# Patient Record
Sex: MALE | Race: WHITE | NOT HISPANIC OR LATINO | Employment: FULL TIME | URBAN - METROPOLITAN AREA
[De-identification: names, ages, dates, MRNs, and addresses within clinical notes are randomized per-mention and may not be internally consistent; named-entity substitution may affect disease eponyms.]

---

## 2018-07-02 ENCOUNTER — OFFICE VISIT (OUTPATIENT)
Dept: URGENT CARE | Facility: PHYSICIAN GROUP | Age: 48
End: 2018-07-02
Payer: COMMERCIAL

## 2018-07-02 ENCOUNTER — APPOINTMENT (OUTPATIENT)
Dept: RADIOLOGY | Facility: IMAGING CENTER | Age: 48
End: 2018-07-02
Attending: PHYSICIAN ASSISTANT
Payer: COMMERCIAL

## 2018-07-02 VITALS
HEIGHT: 72 IN | TEMPERATURE: 99.8 F | WEIGHT: 133 LBS | SYSTOLIC BLOOD PRESSURE: 106 MMHG | HEART RATE: 108 BPM | DIASTOLIC BLOOD PRESSURE: 72 MMHG | OXYGEN SATURATION: 98 % | BODY MASS INDEX: 18.01 KG/M2

## 2018-07-02 DIAGNOSIS — M79.641 RIGHT HAND PAIN: ICD-10-CM

## 2018-07-02 DIAGNOSIS — S69.91XA INJURY OF RIGHT HAND, INITIAL ENCOUNTER: ICD-10-CM

## 2018-07-02 DIAGNOSIS — S62.356A CLOSED NONDISPLACED FRACTURE OF SHAFT OF FIFTH METACARPAL BONE OF RIGHT HAND, INITIAL ENCOUNTER: ICD-10-CM

## 2018-07-02 PROCEDURE — 99204 OFFICE O/P NEW MOD 45 MIN: CPT | Performed by: PHYSICIAN ASSISTANT

## 2018-07-02 PROCEDURE — 73130 X-RAY EXAM OF HAND: CPT | Mod: TC,RT | Performed by: PHYSICIAN ASSISTANT

## 2018-07-02 RX ORDER — HYDROCODONE BITARTRATE AND ACETAMINOPHEN 5; 325 MG/1; MG/1
1-2 TABLET ORAL EVERY 4 HOURS PRN
Qty: 20 TAB | Refills: 0 | Status: SHIPPED | OUTPATIENT
Start: 2018-07-02 | End: 2018-07-07

## 2018-07-02 ASSESSMENT — ENCOUNTER SYMPTOMS
SHORTNESS OF BREATH: 0
FEVER: 0
SENSORY CHANGE: 0
HEADACHES: 0
SEIZURES: 0
BLURRED VISION: 0
TREMORS: 0
FOCAL WEAKNESS: 0
COUGH: 0
DOUBLE VISION: 0
PALPITATIONS: 0
LOSS OF CONSCIOUSNESS: 0
DIZZINESS: 0
SPEECH CHANGE: 0
CHILLS: 0
TINGLING: 0

## 2018-07-02 ASSESSMENT — PAIN SCALES - GENERAL: PAINLEVEL: 8=MODERATE-SEVERE PAIN
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
## 2018-07-02 NOTE — PATIENT INSTRUCTIONS
Metacarpal Fracture  A metacarpal fracture is a break (fracture) of a bone in the hand. Metacarpals are the bones that extend from your knuckles to your wrist. In each hand, you have five metacarpal bones that connect your fingers and your thumb to your wrist.  Some hand fractures have bone pieces that are close together and stable (simple). These fractures may be treated with only a splint or cast. Hand fractures that have many pieces of broken bone (comminuted), unstable bone pieces (displaced), or a bone that breaks through the skin (compound) usually require surgery.  What are the causes?  This injury may be caused by:  · A fall.  · A hard, direct hit to your hand.  · An injury that squeezes your knuckle, stretches your finger out of place, or crushes your hand.  What increases the risk?  This injury is more likely to occur if:  · You play contact sports.  · You have certain bone diseases.  What are the signs or symptoms?  Symptoms of this type of fracture develop soon after the injury. Symptoms may include:  · Swelling.  · Pain.  · Stiffness.  · Increased pain with movement.  · Bruising.  · Inability to move a finger.  · A shortened finger.  · A finger knuckle that looks sunken in.  · Unusual appearance of the hand or finger (deformity).  How is this diagnosed?  This injury may be diagnosed based on your signs and symptoms, especially if you had a recent hand injury. Your health care provider will perform a physical exam. He or she may also order X-rays to confirm the diagnosis.  How is this treated?  Treatment for this injury depends on the type of fracture you have and how severe it is. Possible treatments include:  · Non-reduction. This can be done if the bone does not need to be moved back into place. The fracture can be casted or splinted as it is.  · Closed reduction. If your bone is stable and can be moved back into place, you may only need to wear a cast or splint or have ramon taping.  · Closed  reduction with internal fixation (CRIF). This is the most common treatment. You may have this procedure if your bone can be moved back into place but needs more support. Wires, pins, or screws may be inserted through your skin to stabilize the fracture.  · Open reduction with internal fixation (ORIF). This may be needed if your fracture is severe and unstable. It involves surgery to move your bone back into the right position. Screws, wires, or plates are used to stabilize the fracture.  After all procedures, you may need to wear a cast or a splint for several weeks. You will also need to have follow-up X-rays to make sure that the bone is healing well and staying in position. After you no longer need your cast or splint, you may need physical therapy. This will help you to regain full movement and strength in your hand.  Follow these instructions at home:  If you have a cast:  · Do not stick anything inside the cast to scratch your skin. Doing that increases your risk of infection.  · Check the skin around the cast every day. Report any concerns to your health care provider. You may put lotion on dry skin around the edges of the cast. Do not apply lotion to the skin underneath the cast.  If you have a splint:  · Wear it as directed by your health care provider. Remove it only as directed by your health care provider.  · Loosen the splint if your fingers become numb and tingle, or if they turn cold and blue.  Bathing  · Cover the cast or splint with a watertight plastic bag to protect it from water while you take a bath or a shower. Do not let the cast or splint get wet.  Managing pain, stiffness, and swelling  · If directed, apply ice to the injured area (if you have a splint, not a cast):  ¨ Put ice in a plastic bag.  ¨ Place a towel between your skin and the bag.  ¨ Leave the ice on for 20 minutes, 2-3 times a day.  · Move your fingers often to avoid stiffness and to lessen swelling.  · Raise the injured area  above the level of your heart while you are sitting or lying down.  Driving  · Do not drive or operate heavy machinery while taking pain medicine.  · Do not drive while wearing a cast or splint on a hand that you use for driving.  Activity  · Return to your normal activities as directed by your health care provider. Ask your health care provider what activities are safe for you.  General instructions  · Do not put pressure on any part of the cast or splint until it is fully hardened. This may take several hours.  · Keep the cast or splint clean and dry.  · Do not use any tobacco products, including cigarettes, chewing tobacco, or electronic cigarettes. Tobacco can delay bone healing. If you need help quitting, ask your health care provider.  · Take medicines only as directed by your health care provider.  · Keep all follow-up visits as directed by your health care provider. This is important.  Contact a health care provider if:  · Your pain is getting worse.  · You have redness, swelling, or pain in the injured area.  · You have fluid, blood, or pus coming from under your cast or splint.  · You notice a bad smell coming from under your cast or splint.  · You have a fever.  Get help right away if:  · You develop a rash.  · You have trouble breathing.  · Your skin or nails on your injured hand turn blue or gray even after you loosen your splint.  · Your injured hand feels cold or becomes numb even after you loosen your splint.  · You develop severe pain under the cast or in your hand.  This information is not intended to replace advice given to you by your health care provider. Make sure you discuss any questions you have with your health care provider.  Document Released: 12/18/2006 Document Revised: 05/25/2017 Document Reviewed: 10/07/2015  [employer] Interactive Patient Education © 2017 [employer]

## 2018-07-03 ENCOUNTER — OFFICE VISIT (OUTPATIENT)
Dept: MEDICAL GROUP | Facility: CLINIC | Age: 48
End: 2018-07-03
Payer: COMMERCIAL

## 2018-07-03 VITALS
SYSTOLIC BLOOD PRESSURE: 110 MMHG | DIASTOLIC BLOOD PRESSURE: 72 MMHG | OXYGEN SATURATION: 98 % | TEMPERATURE: 98.2 F | HEIGHT: 72 IN | WEIGHT: 133 LBS | HEART RATE: 100 BPM | BODY MASS INDEX: 18.01 KG/M2 | RESPIRATION RATE: 18 BRPM

## 2018-07-03 DIAGNOSIS — S62.336A CLOSED DISPLACED FRACTURE OF NECK OF FIFTH METACARPAL BONE OF RIGHT HAND, INITIAL ENCOUNTER: ICD-10-CM

## 2018-07-03 DIAGNOSIS — Z72.0 CHEWS TOBACCO: ICD-10-CM

## 2018-07-03 PROCEDURE — 99203 OFFICE O/P NEW LOW 30 MIN: CPT | Performed by: FAMILY MEDICINE

## 2018-07-03 ASSESSMENT — ENCOUNTER SYMPTOMS
SHORTNESS OF BREATH: 0
CHILLS: 0
FEVER: 0
NAUSEA: 0
VOMITING: 0
DIZZINESS: 0
HEADACHES: 0
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
## 2018-07-03 NOTE — PATIENT INSTRUCTIONS
[facility]  [place] Main Office  [address] [address] [phone]    APPOINTMENT TODAY at 1:30 PM with Dr. Trenton Francis  7/3/18  ARRIVE 15 to 30 minutes BEFORE your appointment

## 2018-11-07 ENCOUNTER — TELEPHONE (OUTPATIENT)
Dept: MEDICAL GROUP | Facility: PHYSICIAN GROUP | Age: 48
End: 2018-11-07

## 2018-11-07 NOTE — TELEPHONE ENCOUNTER
Future Appointments       Provider Department Center    11/8/2018 9:55 AM Nury Aguilar M.D. [facility]        NEW PATIENT VISIT PRE-VISIT PLANNING    1.  [employer] Patient is checked in Patient Demographics? YES    2.  Immunizations were updated in [facility] using WebIZ?: Yes       •  Web Iz Recommendations: FLU, MMR , TD and VARICELLA (Chicken Pox)     3.  Is this appointment scheduled as a Hospital Follow-Up? No    4.  Patient is due for the following Health Maintenance Topics:   Health Maintenance Due   Topic Date Due   • IMM INFLUENZA (1) 09/01/2018       5.  Reviewed/Updated the following with patient:   •   Preferred Pharmacy? NO       •   Preferred Lab? NO       •   Preferred Communication? NO       •   Allergies? NO       •   Medications? NO       •   Social History? NO       •   Family History (document living status of immediate family members and if + hx of cancer, diabetes, hypertension, hyperlipidemia, heart attack, stroke) NO    6.  Updated Care Team?       •   DME Company (gait device, O2, CPAP, etc.) NO       •   Other Specialists (eye doctor, derm, GYN, cardiology, endo, etc): NO    7.  MDX printed for Provider? NO    8.  Patient was informed to arrive 15 min prior to their   scheduled appointment and bring in their medication bottles. LVM was unable to get in contact with Pt. prior to visit to complete PVP.

## 2018-11-08 ENCOUNTER — HOSPITAL ENCOUNTER (OUTPATIENT)
Dept: LAB | Facility: MEDICAL CENTER | Age: 48
End: 2018-11-08
Attending: FAMILY MEDICINE
Payer: COMMERCIAL

## 2018-11-08 ENCOUNTER — OFFICE VISIT (OUTPATIENT)
Dept: MEDICAL GROUP | Facility: PHYSICIAN GROUP | Age: 48
End: 2018-11-08
Payer: COMMERCIAL

## 2018-11-08 VITALS
RESPIRATION RATE: 18 BRPM | TEMPERATURE: 97.2 F | HEART RATE: 88 BPM | WEIGHT: 124 LBS | OXYGEN SATURATION: 97 % | HEIGHT: 72 IN | BODY MASS INDEX: 16.8 KG/M2 | DIASTOLIC BLOOD PRESSURE: 78 MMHG | SYSTOLIC BLOOD PRESSURE: 110 MMHG

## 2018-11-08 DIAGNOSIS — Z13.1 SCREENING FOR DIABETES MELLITUS: ICD-10-CM

## 2018-11-08 DIAGNOSIS — Z11.3 SCREEN FOR STD (SEXUALLY TRANSMITTED DISEASE): ICD-10-CM

## 2018-11-08 DIAGNOSIS — Z23 NEED FOR VACCINATION: ICD-10-CM

## 2018-11-08 DIAGNOSIS — F10.10 ALCOHOL ABUSE: ICD-10-CM

## 2018-11-08 DIAGNOSIS — R43.2 TASTE PERVERSION: ICD-10-CM

## 2018-11-08 DIAGNOSIS — Z13.6 SCREENING FOR CARDIOVASCULAR CONDITION: ICD-10-CM

## 2018-11-08 LAB
ALBUMIN SERPL BCP-MCNC: 4.9 G/DL (ref 3.2–4.9)
ALBUMIN/GLOB SERPL: 1.3 G/DL
ALP SERPL-CCNC: 105 U/L (ref 30–99)
ALT SERPL-CCNC: 73 U/L (ref 2–50)
ANION GAP SERPL CALC-SCNC: 16 MMOL/L (ref 0–11.9)
AST SERPL-CCNC: 168 U/L (ref 12–45)
BASOPHILS # BLD AUTO: 1.2 % (ref 0–1.8)
BASOPHILS # BLD: 0.04 K/UL (ref 0–0.12)
BILIRUB SERPL-MCNC: 1.8 MG/DL (ref 0.1–1.5)
BUN SERPL-MCNC: 6 MG/DL (ref 8–22)
CALCIUM SERPL-MCNC: 10 MG/DL (ref 8.5–10.5)
CHLORIDE SERPL-SCNC: 94 MMOL/L (ref 96–112)
CHOLEST SERPL-MCNC: 273 MG/DL (ref 100–199)
CO2 SERPL-SCNC: 25 MMOL/L (ref 20–33)
CREAT SERPL-MCNC: 0.75 MG/DL (ref 0.5–1.4)
EOSINOPHIL # BLD AUTO: 0.05 K/UL (ref 0–0.51)
EOSINOPHIL NFR BLD: 1.5 % (ref 0–6.9)
ERYTHROCYTE [DISTWIDTH] IN BLOOD BY AUTOMATED COUNT: 46.3 FL (ref 35.9–50)
EST. AVERAGE GLUCOSE BLD GHB EST-MCNC: 105 MG/DL
GLOBULIN SER CALC-MCNC: 3.7 G/DL (ref 1.9–3.5)
GLUCOSE SERPL-MCNC: 92 MG/DL (ref 65–99)
HBA1C MFR BLD: 5.3 % (ref 0–5.6)
HCT VFR BLD AUTO: 45.4 % (ref 42–52)
HDLC SERPL-MCNC: 136 MG/DL
HGB BLD-MCNC: 15.7 G/DL (ref 14–18)
IMM GRANULOCYTES # BLD AUTO: 0.01 K/UL (ref 0–0.11)
IMM GRANULOCYTES NFR BLD AUTO: 0.3 % (ref 0–0.9)
LDLC SERPL CALC-MCNC: 119 MG/DL
LYMPHOCYTES # BLD AUTO: 1.06 K/UL (ref 1–4.8)
LYMPHOCYTES NFR BLD: 30.9 % (ref 22–41)
MCH RBC QN AUTO: 34.6 PG (ref 27–33)
MCHC RBC AUTO-ENTMCNC: 34.6 G/DL (ref 33.7–35.3)
MCV RBC AUTO: 100 FL (ref 81.4–97.8)
MONOCYTES # BLD AUTO: 0.46 K/UL (ref 0–0.85)
MONOCYTES NFR BLD AUTO: 13.4 % (ref 0–13.4)
NEUTROPHILS # BLD AUTO: 1.81 K/UL (ref 1.82–7.42)
NEUTROPHILS NFR BLD: 52.7 % (ref 44–72)
NRBC # BLD AUTO: 0 K/UL
NRBC BLD-RTO: 0 /100 WBC
PLATELET # BLD AUTO: 103 K/UL (ref 164–446)
PMV BLD AUTO: 11.1 FL (ref 9–12.9)
POTASSIUM SERPL-SCNC: 4.8 MMOL/L (ref 3.6–5.5)
PROT SERPL-MCNC: 8.6 G/DL (ref 6–8.2)
RBC # BLD AUTO: 4.54 M/UL (ref 4.7–6.1)
SODIUM SERPL-SCNC: 135 MMOL/L (ref 135–145)
TRIGL SERPL-MCNC: 92 MG/DL (ref 0–149)
WBC # BLD AUTO: 3.4 K/UL (ref 4.8–10.8)

## 2018-11-08 PROCEDURE — 36415 COLL VENOUS BLD VENIPUNCTURE: CPT

## 2018-11-08 PROCEDURE — 82607 VITAMIN B-12: CPT

## 2018-11-08 PROCEDURE — 80061 LIPID PANEL: CPT

## 2018-11-08 PROCEDURE — 82746 ASSAY OF FOLIC ACID SERUM: CPT

## 2018-11-08 PROCEDURE — 86780 TREPONEMA PALLIDUM: CPT

## 2018-11-08 PROCEDURE — 99214 OFFICE O/P EST MOD 30 MIN: CPT | Mod: 25 | Performed by: FAMILY MEDICINE

## 2018-11-08 PROCEDURE — 87389 HIV-1 AG W/HIV-1&-2 AB AG IA: CPT

## 2018-11-08 PROCEDURE — 86704 HEP B CORE ANTIBODY TOTAL: CPT

## 2018-11-08 PROCEDURE — 83036 HEMOGLOBIN GLYCOSYLATED A1C: CPT

## 2018-11-08 PROCEDURE — 86706 HEP B SURFACE ANTIBODY: CPT

## 2018-11-08 PROCEDURE — 85025 COMPLETE CBC W/AUTO DIFF WBC: CPT

## 2018-11-08 PROCEDURE — 84443 ASSAY THYROID STIM HORMONE: CPT

## 2018-11-08 PROCEDURE — 90471 IMMUNIZATION ADMIN: CPT | Performed by: FAMILY MEDICINE

## 2018-11-08 PROCEDURE — 87340 HEPATITIS B SURFACE AG IA: CPT

## 2018-11-08 PROCEDURE — 86803 HEPATITIS C AB TEST: CPT

## 2018-11-08 PROCEDURE — 80053 COMPREHEN METABOLIC PANEL: CPT

## 2018-11-08 PROCEDURE — 90686 IIV4 VACC NO PRSV 0.5 ML IM: CPT | Performed by: FAMILY MEDICINE

## 2018-11-08 PROCEDURE — 82306 VITAMIN D 25 HYDROXY: CPT

## 2018-11-08 PROCEDURE — 84425 ASSAY OF VITAMIN B-1: CPT

## 2018-11-08 RX ORDER — CETIRIZINE HYDROCHLORIDE 10 MG/1
10 TABLET ORAL DAILY
COMMUNITY
End: 2020-06-02

## 2018-11-08 ASSESSMENT — PATIENT HEALTH QUESTIONNAIRE - PHQ9
CLINICAL INTERPRETATION OF PHQ2 SCORE: 2
5. POOR APPETITE OR OVEREATING: 1 - SEVERAL DAYS
SUM OF ALL RESPONSES TO PHQ QUESTIONS 1-9: 8

## 2018-11-08 NOTE — PROGRESS NOTES
cc: alcohol dependence      Subjective:     Enzo Quintero is a 47 y.o. male presenting for the following:     Alcohol dependence: patient has had a long history of alcohol abuse. He was admitted in 2011 for alcohol withdrawal. He currently drinks about 8 beers a day and he will get nauseated and tremors if he tries to not drink even for one day.   Both his parents and his brother struggled with alcohol.     His longest time of sobriety was one year. He was in [facility] for the acute withdrawal and then went to  regularly but eventually fell of the wagon. He has also tried outpatient therapy in the past and this was not very helpful.     He does not have any GERD symptoms, abd pain, vomiting, jaundice, diarrhea. He has never used IV drugs.     Taste perversion- he has noticed that for the last year or so he will be eating of drinking something and it will taste very salty to him when it should be sweet. He does not have this problem when he is not eating or drinking. He does not smell things that are not there. No changes in sensation or weakness.     Review of systems:  All others reviewed and are negative.       Current Outpatient Prescriptions:   •  cetirizine (ZYRTEC) 10 MG Tab, Take 10 mg by mouth every day., Disp: , Rfl:   •  folic acid (FOLVITE) 1 MG TABS, Take 1 mg by mouth every day., Disp: , Rfl:   •  Multiple Vitamin (THERAGRAN PO), Take 1 Tab by mouth every day., Disp: , Rfl:   •  omeprazole (PRILOSEC) 20 MG CPDR, Take 20 mg by mouth every day., Disp: , Rfl:   •  thiamine 100 MG tablet, Take 100 mg by mouth every day., Disp: , Rfl:   •  lidocaine viscous 2% (XYLOCAINE) 2 % SOLN, Take 15 mL by mouth every 3 hours as needed., Disp: , Rfl:     Allergies, past medical history, past surgical history, family history, social history reviewed and updated    Objective:     Vitals: /78 (BP Location: Left arm, Patient Position: Sitting, BP Cuff Size: Adult)   Pulse 88   Temp 36.2 °C (97.2 °F) (Temporal)    Resp 18   Ht 1.829 m (6')   Wt 56.2 kg (124 lb)   SpO2 97%   BMI 16.82 kg/m²   General: Alert, pleasant, NAD  HEENT: Normocephalic.   EOMI, no icterus or pallor.  Conjunctivae and lids normal. External ears normal. Oropharynx non-erythematous, mucous membranes moist.    Neck supple.  No thyromegaly or masses palpated. No cervical or supraclavicular lymphadenopathy.  Heart: Regular rate and rhythm.  S1 and S2 normal.  No murmurs appreciated.  Respiratory: Normal respiratory effort.  Clear to auscultation bilaterally.  Abdomen: Non-distended, soft  Skin: Warm, dry, no rashes.  Musculoskeletal: Gait is normal.  Moves all extremities well.  Extremities: No leg edema.    Neurological: No tremors, sensation grossly intact,  tone/strength normal, gait is normal, CN2-12 grossly intact  Psych:  Affect is normal, judgement is good, memory is intact, grooming is appropriate.    Assessment/Plan:     Enzo was seen today for annual exam.    Diagnoses and all orders for this visit:    Alcohol abuse:  Patient with daily alcohol intake and is quick to withdrawal if he stops drinking. He has failed outpatient therapy. He is motivated to get treatment as he is aware that this can lead to early death in many ways.   -Patient given phone number to Dotty Gr with behavioral health for placement in treatment.   -     TSH WITH REFLEX TO FT4; Future  -     VITAMIN B12; Future  -     VITAMIN D,25 HYDROXY; Future  -     CBC WITH DIFFERENTIAL; Future  -     COMP METABOLIC PANEL; Future  -     HIV AG/AB COMBO ASSAY SCREENING; Future  -     T.PALLIDUM AB EIA; Future  -     HEP C VIRUS ANTIBODY; Future  -     HEP B CORE AB TOTAL; Future  -     HEP B SURFACE ANTIGEN; Future  -     HEP B SURFACE AB; Future  -     VITAMIN B1; Future  -     FOLATE; Future    Taste perversion: patient tasting salt when eating sweet foods and drink. Will check for vitamin deficiency but if no obvious abnormality on bloods found, will refer to Neurology.   -      VITAMIN B12; Future  -     VITAMIN B1; Future  -     FOLATE; Future    Screening for cardiovascular condition  -     Lipid Profile; Future    Screening for diabetes mellitus  -     HEMOGLOBIN A1C; Future    Screen for STD (sexually transmitted disease): never IV drug use.   -     HIV AG/AB COMBO ASSAY SCREENING; Future  -     T.PALLIDUM AB EIA; Future  -     HEP C VIRUS ANTIBODY; Future  -     HEP B CORE AB TOTAL; Future  -     HEP B SURFACE ANTIGEN; Future  -     HEP B SURFACE AB; Future    Need for vaccination Patient due for vaccination.  Patient warned of possible side effect including pain, reaction at injection site, fatigue, low-grade fever.  Also, patient warned of uncommon severe reactions including allergic reaction/anaphylaxis.     -     Influenza Vaccine Quad Injection >3Y (PF)      Return in about 3 months (around 2/8/2019).

## 2018-11-09 LAB
25(OH)D3 SERPL-MCNC: 4 NG/ML (ref 30–100)
FOLATE SERPL-MCNC: 10.8 NG/ML
HBV CORE AB SERPL QL IA: NEGATIVE
HBV SURFACE AB SERPL IA-ACNC: <3.1 MIU/ML (ref 0–10)
HBV SURFACE AG SER QL: NEGATIVE
HCV AB SER QL: NEGATIVE
HIV 1+2 AB+HIV1 P24 AG SERPL QL IA: NON REACTIVE
TREPONEMA PALLIDUM IGG+IGM AB [PRESENCE] IN SERUM OR PLASMA BY IMMUNOASSAY: NON REACTIVE
TSH SERPL DL<=0.005 MIU/L-ACNC: 2.22 UIU/ML (ref 0.38–5.33)
VIT B12 SERPL-MCNC: 979 PG/ML (ref 211–911)

## 2018-11-15 LAB — VIT B1 BLD-MCNC: 51 NMOL/L (ref 70–180)

## 2018-11-19 ENCOUNTER — TELEPHONE (OUTPATIENT)
Dept: MEDICAL GROUP | Facility: PHYSICIAN GROUP | Age: 48
End: 2018-11-19

## 2018-11-20 ENCOUNTER — OFFICE VISIT (OUTPATIENT)
Dept: MEDICAL GROUP | Facility: PHYSICIAN GROUP | Age: 48
End: 2018-11-20
Payer: COMMERCIAL

## 2018-11-20 VITALS
OXYGEN SATURATION: 95 % | SYSTOLIC BLOOD PRESSURE: 120 MMHG | TEMPERATURE: 97.9 F | HEART RATE: 100 BPM | DIASTOLIC BLOOD PRESSURE: 88 MMHG | WEIGHT: 127 LBS | BODY MASS INDEX: 17.2 KG/M2 | RESPIRATION RATE: 18 BRPM | HEIGHT: 72 IN

## 2018-11-20 DIAGNOSIS — R74.01 TRANSAMINITIS: ICD-10-CM

## 2018-11-20 DIAGNOSIS — F10.10 ALCOHOL ABUSE: ICD-10-CM

## 2018-11-20 DIAGNOSIS — E55.9 VITAMIN D DEFICIENCY: ICD-10-CM

## 2018-11-20 DIAGNOSIS — R43.2 TASTE PERVERSION: ICD-10-CM

## 2018-11-20 DIAGNOSIS — E51.9 THIAMINE DEFICIENCY: ICD-10-CM

## 2018-11-20 DIAGNOSIS — D69.6 THROMBOCYTOPENIA (HCC): ICD-10-CM

## 2018-11-20 PROCEDURE — 99214 OFFICE O/P EST MOD 30 MIN: CPT | Performed by: FAMILY MEDICINE

## 2018-11-20 RX ORDER — ERGOCALCIFEROL 1.25 MG/1
50000 CAPSULE ORAL
Qty: 12 CAP | Refills: 0 | Status: SHIPPED | OUTPATIENT
Start: 2018-11-20 | End: 2019-05-21

## 2018-11-20 RX ORDER — LANOLIN ALCOHOL/MO/W.PET/CERES
100 CREAM (GRAM) TOPICAL DAILY
Qty: 30 TAB | Refills: 5 | Status: SHIPPED | OUTPATIENT
Start: 2018-11-20 | End: 2018-11-21 | Stop reason: SDUPTHER

## 2018-11-20 RX ORDER — FOLIC ACID 1 MG/1
1 TABLET ORAL DAILY
Qty: 30 TAB | Refills: 5 | Status: SHIPPED | OUTPATIENT
Start: 2018-11-20 | End: 2019-05-21

## 2018-11-20 NOTE — LETTER
[facility]  Nury Aguilar M.D.  [address]  [place] [address]  Fax: [phone]   Authorization for Release/Disclosure of   Protected Health Information   Name: ENZO BONNER : 1970 SSN: [id number]   Address: [facility] [place] [address] Phone:    [phone] (home)    I authorize the entity listed below to release/disclose the PHI below to:   [facility]/Nury Aguilar M.D. and Nury Aguilar M.D.   Provider or Entity Name:     Address   City, State, Zip   Phone:      Fax:     Reason for request: continuity of care   Information to be released:    [  ] LAST COLONOSCOPY,  including any PATH REPORT and follow-up  [  ] LAST FIT/COLOGUARD RESULT [  ] LAST DEXA  [  ] LAST MAMMOGRAM  [  ] LAST PAP  [  ] LAST LABS [  ] RETINA EXAM REPORT  [  ] IMMUNIZATION RECORDS  [  ] Release all info      [  ] Check here and initial the line next to each item to release ALL health information INCLUDING  _____ Care and treatment for drug and / or alcohol abuse  _____ HIV testing, infection status, or AIDS  _____ Genetic Testing    DATES OF SERVICE OR TIME PERIOD TO BE DISCLOSED: _____________  I understand and acknowledge that:  * This Authorization may be revoked at any time by you in writing, except if your health information has already been used or disclosed.  * Your health information that will be used or disclosed as a result of you signing this authorization could be re-disclosed by the recipient. If this occurs, your re-disclosed health information may no longer be protected by State or Federal laws.  * You may refuse to sign this Authorization. Your refusal will not affect your ability to obtain treatment.  * This Authorization becomes effective upon signing and will  on (date) __________.      If no date is indicated, this Authorization will  one (1) year from the signature date.    Name: Enzo Bonner    Signature:   Date:     2018       PLEASE FAX REQUESTED  RECORDS BACK TO: [phone]

## 2018-11-20 NOTE — PROGRESS NOTES
cc: abnormal test results      Subjective:     Enzo Quintero is a 48 y.o. male presenting for the following:     On recent lab results, patient found to have thiamine deficiency, and vitamin D deficiency. He does have a normal diet and no food restrictions. He does alcohol abuse. No tingling or numbness to his hands/feet. No confusion or changes in speech or concentration. No CP, palpitations, swelling, SOB with exercise.     Also, he was found to have a transaminitis and elevated MCV, which are consistent with alcohol abuse. Also with low platelet count. He denies jaundice, abd pain, vomiting, easy bruising/bleeding.       Review of systems:  All others reviewed and are negative.       Current Outpatient Prescriptions:   •  thiamine (THIAMINE) 100 MG tablet, Take 1 Tab by mouth every day., Disp: 30 Tab, Rfl: 5  •  vitamin D, Ergocalciferol, (DRISDOL) 23936 units Cap capsule, Take 1 Cap by mouth every 7 days., Disp: 12 Cap, Rfl: 0  •  folic acid (FOLVITE) 1 MG Tab, Take 1 Tab by mouth every day., Disp: 30 Tab, Rfl: 5  •  cetirizine (ZYRTEC) 10 MG Tab, Take 10 mg by mouth every day., Disp: , Rfl:   •  Multiple Vitamin (THERAGRAN PO), Take 1 Tab by mouth every day., Disp: , Rfl:   •  omeprazole (PRILOSEC) 20 MG CPDR, Take 20 mg by mouth every day., Disp: , Rfl:   •  lidocaine viscous 2% (XYLOCAINE) 2 % SOLN, Take 15 mL by mouth every 3 hours as needed., Disp: , Rfl:     Allergies, past medical history, past surgical history, family history, social history reviewed and updated    Objective:     Vitals: /88 (BP Location: Left arm, Patient Position: Sitting, BP Cuff Size: Adult)   Pulse 100   Temp 36.6 °C (97.9 °F) (Temporal)   Resp 18   Ht 1.829 m (6')   Wt 57.6 kg (127 lb)   SpO2 95%   BMI 17.22 kg/m²   General: Alert, pleasant, NAD  HEENT: Normocephalic.  Oropharynx non-erythematous, mucous membranes moist.    Heart: Regular rate and rhythm.  S1 and S2 normal.  No murmurs appreciated.  Respiratory:  Normal respiratory effort.  Clear to auscultation bilaterally.  Abdomen: Non-distended, soft, NT  Skin: Warm, dry, no rashes.  Musculoskeletal: Gait is normal.  Moves all extremities well.  Extremities: No leg edema.    Neurological: No tremors, sensation grossly intact,  tone/strength normal, gait is normal, CN2-12 grossly intact  Psych:  Affect is normal, judgement is good, memory is intact, grooming is appropriate.    Assessment/Plan:     Enzo was seen today for results.    Diagnoses and all orders for this visit:    Taste perversion Patient has been tasting salty when it should be sweet. Patient declines MRI of head currently. He would like to treat his vitamin deficiencies and will then agree to imaging if not improved.  Possibly sinus infection but patient without history of allergic rhinitis or facial pressure.     Thrombocytopenia (HCC): concern for decreased liver function due to alcohol. will recheck in 2 weeks. No symptoms.   -     CBC WITHOUT DIFFERENTIAL; Future    Thiamine deficiency No symptoms of wet or dry beriberi or wernicke currently. Suggest daily high dose oral thiamine with folic acid supplementation and daily folic acid.   -     thiamine (THIAMINE) 100 MG tablet; Take 1 Tab by mouth every day.  -     folic acid (FOLVITE) 1 MG Tab; Take 1 Tab by mouth every day.    Vitamin D deficiency  -     vitamin D, Ergocalciferol, (DRISDOL) 38654 units Cap capsule; Take 1 Cap by mouth every 7 days.    Alcohol abuse/Transaminitis: prior imaging in 2011 with normal echogenicity of the liver. Will recheck. Neg for Hep B/C.    -     US-ABDOMEN COMPLETE SURVEY; Future      Return in about 2 months (around 1/20/2019).

## 2018-11-20 NOTE — TELEPHONE ENCOUNTER
Future Appointments       Provider Department Center    11/20/2018 11:35 AM Nury Aguilar M.D. [facility]        ESTABLISHED PATIENT PRE-VISIT PLANNING     Patient was NOT contacted to complete PVP.     Note: Patient will not be contacted if there is no indication to call.     1.  Reviewed notes from the last few office visits within the medical group: Yes    2.  If any orders were placed at last visit or intended to be done for this visit (i.e. 6 mos follow-up), do we have Results/Consult Notes?        •  Labs - Labs ordered, completed on 11/08/18 and results are in chart.       •  Imaging - Imaging was not ordered at last office visit.       •  Referrals - No referrals were ordered at last office visit.    3. Is this appointment scheduled as a Hospital Follow-Up? No    4.  Immunizations were updated in [facility] using WebIZ?: Yes       •  Web Iz Recommendations: MMR , TD and VARICELLA (Chicken Pox)     5.  Patient is due for the following Health Maintenance Topics:   Health Maintenance Due   Topic Date Due   • IMM HEP B VACCINE (1 of 3 - Risk 3-dose series) 11/14/1991       6.  MDX printed for Provider? NO    7.  Patient was NOT informed to arrive 15 min prior to their scheduled appointment and bring in their medication bottles. No VM was unable to get in contact with  Pt.

## 2018-11-21 DIAGNOSIS — E51.9 THIAMINE DEFICIENCY: ICD-10-CM

## 2018-11-21 RX ORDER — LANOLIN ALCOHOL/MO/W.PET/CERES
100 CREAM (GRAM) TOPICAL DAILY
Qty: 30 TAB | Refills: 5 | Status: SHIPPED
Start: 2018-11-21 | End: 2019-02-05

## 2018-12-31 ENCOUNTER — OFFICE VISIT (OUTPATIENT)
Dept: BEHAVIORAL HEALTH | Facility: CLINIC | Age: 48
End: 2018-12-31
Payer: COMMERCIAL

## 2018-12-31 DIAGNOSIS — F10.920 ALCOHOLIC INTOXICATION WITHOUT COMPLICATION (HCC): ICD-10-CM

## 2018-12-31 DIAGNOSIS — F10.20 ALCOHOL USE DISORDER, SEVERE, DEPENDENCE (HCC): ICD-10-CM

## 2018-12-31 PROBLEM — F10.929 ALCOHOL INTOXICATION (HCC): Status: ACTIVE | Noted: 2018-12-31

## 2018-12-31 PROCEDURE — 90791 PSYCH DIAGNOSTIC EVALUATION: CPT | Performed by: PSYCHOLOGIST

## 2018-12-31 NOTE — BH THERAPY
[facility]  INITIAL ASSESSMENT    Name: Enzo Quintero  MRN: [id number]  : 1970  Age: 48 y.o.  Date of assessment: 2018  PCP: Nury Aguilar M.D.  Persons in attendance: Patient  Total session time: 50 minutes      CHIEF COMPLAINT AND HISTORY OF PRESENTING PROBLEM:  (as stated by Patient):  Enzo Quintero is a 48 y.o., White male referred for assessment by No ref. provider found.  Primary presenting issue includes   Chief Complaint   Patient presents with   • Alcohol Problem   The patient ID/Chief Complaint:  The patient is a 48 year old male, , .  The patient self-referred and voluntarily presented for an individual intake to address chronic alcohol use disorder patient reports that he is currently drinking 10-16 drinks with his drink of choice being beer every day he also indicated that 30 minutes prior to the appointment he consumed 1 beer he was not operating a vehicle his wife drove the patient to the appointment.  During the session he obtained a blood alcohol level of 0.2. Reviewed limits of confidentiality and [facility] policies; patient expressed understanding and agreed to voluntarily proceed with evaluation and treatment.     Review of Symptoms:  Depression and other mood disorders   Increase in sleep No    Decrease in sleep No    Interest (anhedonia) No     Guilt feeling Yes   Worthless No   Hopelessness No    Regret Not Reported/Not Observed     Energy deficit No     Concentration deficit No      Appetite deficit No   Psychomotor   Retardation No     Agitation No      Suicidality No   Distractibility No    Indiscretions No    Grandiosity No    Flight of ideas No    Activity level Increase No   Sleep deficit (decreased need for 3 days) No   Talkativeness No    Anxiety Symptoms   Panic Attacks No     Last   Duration   Frequency   Night Time No   Breathing Difficulties No   Shallow Breathing No   Chest Pain No   Chest Pressure/ Chest  Tightness No   Heart Pounding/Heart Palpitations No   Hyperventilation No   Sweating No   Muscle Tension/ Sore Muscles No   Concentration Problems No   Depersonalization/ Derealization No   Difficulty Speaking No   Digestion Issues No   Dizziness No   Headaches No   Lightheadedness No   Fear No   Feeling Ill No   Worrying No   Nausea No   Feeling Overwhelmed No   Feeling Shaky No   Low Energy No   Shaking No     Restlessness No     Easily fatigued No       Social Anxiety No       PTSD No       Sleep Disturbance Denies    Difficulty falling asleep No    Difficulty staying asleep No    Restless No    Unsatisfying sleep No    Nightmares No    Sleepwalking No    Restless legs No   Sleep Apnea No   Substance abuse Present 30 years   Obsessive Symptoms No   Compulsive Symptoms No   Eating Disorder No    Body Dysmorphic Symptoms No   Somatic Symptoms No   Illness Anxiety No   Neurocognitive Disorder  Disturbance in attention No   Disturbance developed Not Reported/ Observed   Additional Disturbance None   Psychotic disorders Not Reported/ Observed    Delusions No   Hallucination No   Disorganized Speech No   Grossly Disorganized Behavior No   Negative Symptoms No       Relevant History:    FAMILY/SOCIAL HISTORY  The patient was raised by mother and denies history of abuse and neglect. The patient parents  when he was 5 years old and mother got the primary custody. The patient has 1 brother. The patient graduated high while in elementary and secondary did not require special education without history of suspension or explosions. Patient has been  for 13 years and has 8 year old son who was adopted 3 years. The patient is currently unemployed and denies history of being fired from a job.  Family History   Problem Relation Age of Onset   • Alcohol abuse Mother    • Alcohol abuse Father    • Alcohol abuse Brother         suicide        BEHAVIORAL HEALTH TREATMENT HISTORY  Past Psychiatric History:  Past  psychiatric hospitalization Denies   Self-harm denies   Suicide attempt Denies    Method of suicide attempt N/A  Past Psychotherapy No   Past Couple/Martial Therapy No     Current psychotropic medication None    Family Psychiatric History:    Mother substance use disorder- Alcohol  Father substance use disorder-Alcohol  Brother substance use disorder Alcohol and completed suicide    MEDICAL HISTORY     Past medical/surgical history:   Past Medical History:   Diagnosis Date   • Allergy       Past Surgical History:   Procedure Laterality Date   • OTHER ORTHOPEDIC SURGERY      R ankle        Medication Allergies:  Patient has no known allergies.     Does patient/parent report nutritional concerns? Yes  Does patient/parent report change in appetite or weight loss/gain? No  Does patient/parent report history of eating disorder symptoms? No  Does patient/parent report dental problem? No  Does patient/parent report physical pain? No       Does patient/parent report functional impact of medical, developmental, or pain issues?   no     HISTORY:  Does patient report current or past enlistment? No       SPIRITUAL/CULTURAL/IDENTITY:    Does the patient identify any spiritual/cultural/identity factors as relevant to the presenting issue? No    LEGAL HISTORY  Has the patient ever been involved with juvenile, adult, or family legal systems? Yes   [If yes, trigger section below:]  Does patient report ever being a victim of a crime?  Yes  Does patient report involvement in any current legal issues?  No  Does patient report ever being arrested or committing a crime? Yes  Does patient report any current agency (parole/probation/CPS/) involvement? No    ABUSE/NEGLECT/TRAUMA SCREENING  Does patient report feeling “unsafe” in his/her home, or afraid of anyone? Yes  Does patient report any history of physical, sexual, or emotional abuse? No  Does parent or significant other report any of the above? No  Is there  evidence of neglect by self? No  Is there evidence of neglect by a caregiver? No  Does the patient/parent report any history of CPS/APS/police involvement related to suspected abuse/neglect or domestic violence? No  Does the patient/parent report any other history of potentially traumatic life events? No  Based on the information provided during the current assessment, is a mandated report of suspected abuse/neglect being made?  No     SAFETY ASSESSMENT - SELF  The patient denied any suicide-related ideation and/or behaviors and intent/plan, denied thoughts about death and dying both in session and during the period since last appointment, or past 2 weeks.    Risk Level: Not Currently at Clinically Significant Risk  Hospitalization is not deemed necessary at this time as the patient does not present a clear or imminent danger to self or others but does need Alcohol Detox now. Pursuing higher level of care. Outpatient management is currently most appropriate and least restrictive level of care.      Current Suicide Risk: Low  Crisis Safety Plan completed and copy given to patient: No    SAFETY ASSESSMENT - OTHERS  Does paor past symptoms of aggressive behavior or risk to others? No  Does parent/significant othtient acknowledge current or past symptoms of aggressive behavior or risk to others? No  Does parent/significant other report patient has current or past symptoms of aggressive behavior or risk to others? No    Recent change in frequency/specificity/intensity of thoughts or threats to harm others? No  Current access to firearms/other identified means of harm? No  If yes, willing to restrict access to weapons/means of harm? No  Protective factors present: Well-developed sense of empathy    Current Homicide Risk:  Not applicable  Crisis Safety Plan completed and copy given to patient? No  Based on information provided during the current assessment, is a mandated “duty to warn” being exercised? No    SUBSTANCE  USE/ADDICTION HISTORY  [] Not applicable - patient 10 years of age or younger    Is there a family history of substance use/addiction? Yes  Does patient acknowledge or parent/significant other report use of/dependence on substances? Yes   Alcohol reports current abuse   Cannabis denies 10 years ago   Nicotine denies   Illicit drugs denies    The patient denies history of substance abuse treatment and reports history of arrest and/or convict for alcohol-DUI 1999 and 2000  Any other street drugs ever tried even once? Yes  Any use of prescription medications/pills without a prescription, or for reasons others than originally prescribed?  No  Any other addictive behavior reported (gambling, shopping, sex)? No      What consequences does the patient associate with any of the above substance use and or addictive behaviors? Work problems or losses    Patient’s motivation/readiness for change: 8 out of 10    [] Patient denies use of any substance/addictive behaviors    STRENGTHS/ASSETS  Strengths Identified by interviewer: Family suppport  Strengths Identified by patient: desire to change    MENTAL STATUS/OBSERVATIONS    Patient did not present in acute distress. Patient was appropriately groomed. Patient was alert and oriented x4. Eye contact was appropriate. No abnormalities in attention or concentration were noted. No abnormalities of movement present; psychomotor activity was normal. Speech was fluent and regular in rhythm, rate, volume, and tone. Thought processes linear, logical and goal directed. There was no evidence of thought disorder. No auditory or visual hallucinations. Long and short term memory appeared to be intact. Insight, judgment, and impulse control were deemed to be fair.  Reported mood was “concerned.” Affect was full-ranging and appropriate to thought content and conversation.  Patient denied past and current suicidal and homicidal ideation in plan, intent, and preparatory behavior.        RESULTS OF  SCREENING MEASURES:  [] Not applicable  Psychometric Test Results:       BHM-20  Global Mental Health  Within Normal Limits  Well Being Scale  Within Normal Limits  Symptoms Scale  Within Normal Limits  Anxiety Subscale  Within Normal Limits  Depression Subscale  Within Normal Limits  Alcohol/Drug Subscale Severe Distress  Bipolar Subscale  Within Normal Limits  Eating Disorder Subscale Within Normal Limits  Harm to other Subscale Within Normal Limits  Suicide Monitoring Scale No Indication of Risk  Life Functioning Scale   Within Normal Limits       CLINICAL FORMULATION: The patient is a 48-year-old  male with a 30-year history of alcohol use disorder.  In the past 3-4 years his alcohol use disorder has become severe and he is consuming 10-15 drinks per day and has to wake up in the morning and have a drink in order to function.  Patient reports that he recently was terminated from a position not as a result of alcohol directly but because of poor performance which she recognizes was most likely as a result of his high level of consumption of alcohol.  The patient has a history of 2 previous DUI arrest in 1999 in 2000.      DIAGNOSTIC IMPRESSION(S):  No diagnosis found.      IDENTIFIED NEEDS/PLAN:  [If any of these marked, trigger DISPOSITION list]  Acute substance withdrawal  Refer to [facility]    Does patient express agreement with the above plan? Yes     Referral appointment(s) scheduled? No     1) The patient will return to the clinic 2-3 weeks.  2) Crisis Response Plan:  Reviewed emergency resources with the patient and the patient expressed understanding including:  If feeling suicidal, patient will call or present to the [facility] during duty hours or present to closest ED ([facility] or [facility], call 911 or crisis hotline ([phone]) after duty hours.  3) Referrals/Consults:  Referred the patient to [facility] for detoxification and substance abuse rehabilitation.  Also referred the patient to [facility] Partial Hospitalization Program and/or intensive outpatient upon discharge from [facility].  4) Patient Education: Educated the patient about abrupt discontinuation of alcohol and the possible symptoms including death.  Patient was encouraged to continue his drinking until he can participate in detoxification he was going to discuss detoxification with his wife and was hoping to be able to go to [facility] in the next few days.  The patient was also made a follow-up appointment with this provider and encouraged to return if he does not follow through with [facility].  5) Barriers to Learning:  No  6) Readiness to Learn:  Yes  7) Cultural Concerns:  No  8) Patient voiced understanding of, and agreement with, plan and goals as annotated above.  9) Declare these services are medically necessary and appropriate to the patient’s diagnosis and needs  10) The point of contact at the [facility] regarding this evaluation is Dr. Miranda, Psychologist.    Ghanshyam Miranda III, Ed.D.

## 2019-01-16 LAB — HBA1C MFR BLD: 5.1 % (ref ?–5.8)

## 2019-02-05 ENCOUNTER — OFFICE VISIT (OUTPATIENT)
Dept: MEDICAL GROUP | Facility: PHYSICIAN GROUP | Age: 49
End: 2019-02-05
Payer: COMMERCIAL

## 2019-02-05 VITALS
TEMPERATURE: 98.1 F | OXYGEN SATURATION: 97 % | WEIGHT: 134 LBS | HEIGHT: 72 IN | HEART RATE: 100 BPM | RESPIRATION RATE: 18 BRPM | DIASTOLIC BLOOD PRESSURE: 80 MMHG | BODY MASS INDEX: 18.15 KG/M2 | SYSTOLIC BLOOD PRESSURE: 110 MMHG

## 2019-02-05 DIAGNOSIS — F10.20 ALCOHOL USE DISORDER, SEVERE, DEPENDENCE (HCC): ICD-10-CM

## 2019-02-05 DIAGNOSIS — R74.01 TRANSAMINITIS: ICD-10-CM

## 2019-02-05 DIAGNOSIS — D69.6 THROMBOCYTOPENIA (HCC): ICD-10-CM

## 2019-02-05 DIAGNOSIS — E55.9 VITAMIN D DEFICIENCY: ICD-10-CM

## 2019-02-05 DIAGNOSIS — E51.9 THIAMINE DEFICIENCY: ICD-10-CM

## 2019-02-05 DIAGNOSIS — Z23 NEED FOR VACCINATION: ICD-10-CM

## 2019-02-05 PROBLEM — R43.2: Status: RESOLVED | Noted: 2018-11-08 | Resolved: 2019-02-05

## 2019-02-05 PROBLEM — F10.929 ALCOHOL INTOXICATION (HCC): Status: RESOLVED | Noted: 2018-12-31 | Resolved: 2019-02-05

## 2019-02-05 PROCEDURE — 99214 OFFICE O/P EST MOD 30 MIN: CPT | Mod: 25 | Performed by: FAMILY MEDICINE

## 2019-02-05 PROCEDURE — 90746 HEPB VACCINE 3 DOSE ADULT IM: CPT | Performed by: FAMILY MEDICINE

## 2019-02-05 PROCEDURE — 90471 IMMUNIZATION ADMIN: CPT | Performed by: FAMILY MEDICINE

## 2019-02-05 RX ORDER — DOXEPIN HYDROCHLORIDE 50 MG/1
50 CAPSULE ORAL NIGHTLY
COMMUNITY
End: 2019-05-21

## 2019-02-05 RX ORDER — ACAMPROSATE CALCIUM 333 MG/1
666 TABLET, DELAYED RELEASE ORAL 3 TIMES DAILY
Qty: 180 TAB | Refills: 3 | Status: SHIPPED | OUTPATIENT
Start: 2019-02-05 | End: 2019-05-21

## 2019-02-05 RX ORDER — ACAMPROSATE CALCIUM 333 MG/1
666 TABLET, DELAYED RELEASE ORAL 3 TIMES DAILY
COMMUNITY
End: 2019-02-05 | Stop reason: SDUPTHER

## 2019-02-05 NOTE — LETTER
[facility]  Nury Aguilar M.D.  [address]  [place] [address]  Fax: [phone]   Authorization for Release/Disclosure of   Protected Health Information   Name: ENZO BONNER : 1970 SSN: [id number]   Address: [address] [facility] [place] [address] Phone:    [phone] (home)    I authorize the entity listed below to release/disclose the PHI below to:   [facility]/Nury Aguilar M.D. and Nury Aguilar M.D.   Provider or Entity Name:     Address   City, State, Zip   Phone:      Fax:     Reason for request: continuity of care   Information to be released:    [  ] LAST COLONOSCOPY,  including any PATH REPORT and follow-up  [  ] LAST FIT/COLOGUARD RESULT [  ] LAST DEXA  [  ] LAST MAMMOGRAM  [  ] LAST PAP  [XX] LAST LABS [  ] RETINA EXAM REPORT  [  ] IMMUNIZATION RECORDS  [  ] Release all info      [  ] Check here and initial the line next to each item to release ALL health information INCLUDING  _____ Care and treatment for drug and / or alcohol abuse  _____ HIV testing, infection status, or AIDS  _____ Genetic Testing    DATES OF SERVICE OR TIME PERIOD TO BE DISCLOSED: _____________  I understand and acknowledge that:  * This Authorization may be revoked at any time by you in writing, except if your health information has already been used or disclosed.  * Your health information that will be used or disclosed as a result of you signing this authorization could be re-disclosed by the recipient. If this occurs, your re-disclosed health information may no longer be protected by State or Federal laws.  * You may refuse to sign this Authorization. Your refusal will not affect your ability to obtain treatment.  * This Authorization becomes effective upon signing and will  on (date) __________.      If no date is indicated, this Authorization will  one (1) year from the signature date.    Name: Enzo Bonner    Signature:   Date:     2019       PLEASE FAX REQUESTED  RECORDS BACK TO: [phone]

## 2019-02-05 NOTE — PROGRESS NOTES
cc: f/u alcohol abuse       Subjective:     Enzo Quintero is a 48 y.o. male presenting for the following:     Patient completed inpatient detox Jan 8-21st. Patient has been sober since then. He feels well. He has been taking campral TID and has not side effect and absolutely no craving for alcohol. He is very happy with this medication. Patient is taking a a mens multi-vitamin daily. Is taking a prilosec once daily as needed.     His taste perversion has completely resolved. He was having normal appetite.     Has been taking prevagen and wondering if there is a prescription version that would be cheaper.     Review of systems:  All others reviewed and are negative.       Current Outpatient Prescriptions:   •  doxepin (SINEQUAN) 50 MG Cap, Take 50 mg by mouth every evening., Disp: , Rfl:   •  acamprosate (CAMPRAL) 333 MG tablet, Take 2 Tabs by mouth 3 times a day., Disp: 180 Tab, Rfl: 3  •  Apoaequorin 10 MG Cap, Take 1 Cap by mouth every day., Disp: 30 Cap, Rfl: 2  •  folic acid (FOLVITE) 1 MG Tab, Take 1 Tab by mouth every day., Disp: 30 Tab, Rfl: 5  •  cetirizine (ZYRTEC) 10 MG Tab, Take 10 mg by mouth every day., Disp: , Rfl:   •  Multiple Vitamin (THERAGRAN PO), Take 1 Tab by mouth every day., Disp: , Rfl:   •  vitamin D, Ergocalciferol, (DRISDOL) 43427 units Cap capsule, Take 1 Cap by mouth every 7 days., Disp: 12 Cap, Rfl: 0  •  omeprazole (PRILOSEC) 20 MG CPDR, Take 20 mg by mouth every day., Disp: , Rfl:   •  lidocaine viscous 2% (XYLOCAINE) 2 % SOLN, Take 15 mL by mouth every 3 hours as needed., Disp: , Rfl:     Allergies, past medical history, past surgical history, family history, social history reviewed and updated    Objective:     Vitals: /80 (BP Location: Left arm, Patient Position: Sitting, BP Cuff Size: Adult)   Pulse 100   Temp 36.7 °C (98.1 °F) (Temporal)   Resp 18   Ht 1.829 m (6')   Wt 60.8 kg (134 lb)   SpO2 97%   BMI 18.17 kg/m²   General: Alert, pleasant, NAD  HEENT:  Normocephalic.   EOMI, no icterus or pallor.    Extremities: No leg edema.    Neurological: No tremors, gait is normal, CN2-12 grossly intact  Psych:  Affect is normal, judgement is good, memory is intact, grooming is appropriate.    Assessment/Plan:     Enzo was seen today for hospital follow-up.    Diagnoses and all orders for this visit:    Alcohol use disorder, severe, dependence (HCC): Patient s/p inpatient detox and doing very well. Good social support and mood.   -     COMP METABOLIC PANEL; Future    Thiamine deficiency: Now eating well and taking multivitamin. Will recheck in 3 months to ensure improvement.   -     VITAMIN B1; Future    Transaminitis: will recheck to see if improved.   -     COMP METABOLIC PANEL; Future    Thrombocytopenia (HCC): will monitor in 3 months   -     CBC WITHOUT DIFFERENTIAL; Future    Vitamin D deficiency  -     VITAMIN D,25 HYDROXY; Future    Other orders  -     acamprosate (CAMPRAL) 333 MG tablet; Take 2 Tabs by mouth 3 times a day.  -     Apoaequorin 10 MG Cap; Take 1 Cap by mouth every day.    Need for vaccination: Hep B negative but testing did not show immunity. Patient agrees to immunization today. Patient warned of possible side effect including pain, reaction at injection site, fatigue, low-grade fever.  Also, patient warned of uncommon severe reactions including allergic reaction/anaphylaxis.       Return in about 6 months (around 8/5/2019).

## 2019-04-18 ENCOUNTER — HOSPITAL ENCOUNTER (OUTPATIENT)
Dept: LAB | Facility: MEDICAL CENTER | Age: 49
End: 2019-04-18
Attending: FAMILY MEDICINE
Payer: COMMERCIAL

## 2019-04-18 DIAGNOSIS — D69.6 THROMBOCYTOPENIA (HCC): ICD-10-CM

## 2019-04-18 DIAGNOSIS — E51.9 THIAMINE DEFICIENCY: ICD-10-CM

## 2019-04-18 DIAGNOSIS — E55.9 VITAMIN D DEFICIENCY: ICD-10-CM

## 2019-04-18 DIAGNOSIS — R74.01 TRANSAMINITIS: ICD-10-CM

## 2019-04-18 DIAGNOSIS — F10.20 ALCOHOL USE DISORDER, SEVERE, DEPENDENCE (HCC): ICD-10-CM

## 2019-04-18 LAB
25(OH)D3 SERPL-MCNC: 55 NG/ML (ref 30–100)
ALBUMIN SERPL BCP-MCNC: 4.1 G/DL (ref 3.2–4.9)
ALBUMIN/GLOB SERPL: 1.6 G/DL
ALP SERPL-CCNC: 47 U/L (ref 30–99)
ALT SERPL-CCNC: 26 U/L (ref 2–50)
ANION GAP SERPL CALC-SCNC: 9 MMOL/L (ref 0–11.9)
AST SERPL-CCNC: 28 U/L (ref 12–45)
BILIRUB SERPL-MCNC: 0.5 MG/DL (ref 0.1–1.5)
BUN SERPL-MCNC: 15 MG/DL (ref 8–22)
CALCIUM SERPL-MCNC: 9.4 MG/DL (ref 8.5–10.5)
CHLORIDE SERPL-SCNC: 104 MMOL/L (ref 96–112)
CO2 SERPL-SCNC: 26 MMOL/L (ref 20–33)
CREAT SERPL-MCNC: 0.65 MG/DL (ref 0.5–1.4)
ERYTHROCYTE [DISTWIDTH] IN BLOOD BY AUTOMATED COUNT: 42.6 FL (ref 35.9–50)
GLOBULIN SER CALC-MCNC: 2.6 G/DL (ref 1.9–3.5)
GLUCOSE SERPL-MCNC: 99 MG/DL (ref 65–99)
HCT VFR BLD AUTO: 40.7 % (ref 42–52)
HGB BLD-MCNC: 13 G/DL (ref 14–18)
MCH RBC QN AUTO: 30.5 PG (ref 27–33)
MCHC RBC AUTO-ENTMCNC: 31.9 G/DL (ref 33.7–35.3)
MCV RBC AUTO: 95.5 FL (ref 81.4–97.8)
PLATELET # BLD AUTO: 234 K/UL (ref 164–446)
PMV BLD AUTO: 10.8 FL (ref 9–12.9)
POTASSIUM SERPL-SCNC: 4 MMOL/L (ref 3.6–5.5)
PROT SERPL-MCNC: 6.7 G/DL (ref 6–8.2)
RBC # BLD AUTO: 4.26 M/UL (ref 4.7–6.1)
SODIUM SERPL-SCNC: 139 MMOL/L (ref 135–145)
WBC # BLD AUTO: 5.2 K/UL (ref 4.8–10.8)

## 2019-04-18 PROCEDURE — 85027 COMPLETE CBC AUTOMATED: CPT

## 2019-04-18 PROCEDURE — 80053 COMPREHEN METABOLIC PANEL: CPT

## 2019-04-18 PROCEDURE — 36415 COLL VENOUS BLD VENIPUNCTURE: CPT

## 2019-04-18 PROCEDURE — 84425 ASSAY OF VITAMIN B-1: CPT

## 2019-04-18 PROCEDURE — 82306 VITAMIN D 25 HYDROXY: CPT

## 2019-04-23 LAB — VIT B1 BLD-MCNC: 144 NMOL/L (ref 70–180)

## 2019-04-26 DIAGNOSIS — D64.9 ANEMIA, UNSPECIFIED TYPE: ICD-10-CM

## 2019-05-21 ENCOUNTER — OFFICE VISIT (OUTPATIENT)
Dept: MEDICAL GROUP | Facility: PHYSICIAN GROUP | Age: 49
End: 2019-05-21
Payer: COMMERCIAL

## 2019-05-21 VITALS
TEMPERATURE: 99.4 F | BODY MASS INDEX: 18.15 KG/M2 | SYSTOLIC BLOOD PRESSURE: 106 MMHG | OXYGEN SATURATION: 98 % | WEIGHT: 134 LBS | HEIGHT: 72 IN | HEART RATE: 98 BPM | DIASTOLIC BLOOD PRESSURE: 64 MMHG

## 2019-05-21 DIAGNOSIS — N63.20 MASS OF LEFT BREAST: ICD-10-CM

## 2019-05-21 PROCEDURE — 99214 OFFICE O/P EST MOD 30 MIN: CPT | Performed by: PHYSICIAN ASSISTANT

## 2019-05-21 ASSESSMENT — PATIENT HEALTH QUESTIONNAIRE - PHQ9: CLINICAL INTERPRETATION OF PHQ2 SCORE: 0

## 2019-05-21 NOTE — PROGRESS NOTES
Subjective:   Enzo Quintero is a 48 y.o. male here today for breast lump. Is an established patient of Nury Aguilar MD.    HPI:    Patient presents to the office today with concerns for a lump on the left side of his chest, just above his nipple.  Has noticed this for about the last month.  Lump is tender to the touch. Does not bother him at all when he's not touching it. Denies any visible skin changes, nipple discharge, or noticeable axillary lymphadenopathy. He does not recall ever having anything like this in the past. Denies family history of male breast cancer. Past medical history is significant for alcoholism in remission since January of this year. He had some transaminitis in the past presumed secondary to his drinking at the time, but that has resolved. He is otherwise healthy. No known renal, thyroid disease, or hypogonadism. He denies any other associated symptoms to include unexplained weight loss, tachycardia/persistent palpitations, chest pain, or dyspnea.    Current medicines (including changes today)  Current Outpatient Prescriptions   Medication Sig Dispense Refill   • cetirizine (ZYRTEC) 10 MG Tab Take 10 mg by mouth every day.     • Multiple Vitamin (THERAGRAN PO) Take 1 Tab by mouth every day.     • omeprazole (PRILOSEC) 20 MG CPDR Take 20 mg by mouth every day.       No current facility-administered medications for this visit.      He  has a past medical history of Allergy.    ROS  As per HPI.       Objective:     /64 (BP Location: Left arm, Patient Position: Sitting, BP Cuff Size: Adult)   Pulse 98   Temp 37.4 °C (99.4 °F)   Ht 1.829 m (6')   Wt 60.8 kg (134 lb)   SpO2 98%  Body mass index is 18.17 kg/m².     Physical Exam:  Constitutional: Alert, well-appearing, no distress.  Skin: Warm, dry, good turgor, no rashes in visible areas.  Eye: Pupils are equal and round, conjunctiva clear, lids normal.  ENMT: Lips without lesions, moist mucus membranes.  Breast: Focused exam  performed to the left breast. There is no visible breast abnormality to include skin dimpling/stippling/abnormal coloration. On palpation, there is palpable tender mass vs. breast hypertrophy immediately superior-lateral to the nipple. No visible nipple discharge. No axillary or supraclavicular lymphadenopathy.  Respiratory: Unlabored respiratory effort, lungs clear to auscultation, no wheezes, no rhonchi.  Cardiovascular: Normal S1, S2, no murmur.    A chaperone was offered to the patient during today's exam. Patient declined chaperone.      Assessment and Plan:   The following treatment plan was discussed    1. Mass of left breast  New problem, uncontrolled and needing further evaluation. Difficult to tell from exam if palpable abnormality is true breast mass or localized gynecomastia. The palpable induration is not circumferential around the nipple, raising suspicion for mass. Thus, I am recommending diagnostic mammography to further evaluate. Will also obtain some additional lab work to rule out secondary causes in the event that this does represent gynecomastia. I did review the screening lab work he had done last month, which was unremarkable other than mild anemia. A repeat CBC was ordered to re-assess this which he can have done with the other blood work I have ordered.  - MA DIAGNOSTIC MAMMO LEFT W/CAD; Future  - HCG QUANTITATIVE; Future  - FSH/LH; Future  - ESTRADIOL; Future  - REFERENCE MISC.AMBIENT; Future  - TSH WITH REFLEX TO FT4; Future    Followup: Return for f/u pending diagnostic results.    Yulissa Real P.A.-C.

## 2019-05-28 ENCOUNTER — HOSPITAL ENCOUNTER (OUTPATIENT)
Dept: LAB | Facility: MEDICAL CENTER | Age: 49
End: 2019-05-28
Attending: PHYSICIAN ASSISTANT
Payer: COMMERCIAL

## 2019-05-28 DIAGNOSIS — N63.20 MASS OF LEFT BREAST: ICD-10-CM

## 2019-05-28 LAB
B-HCG SERPL-ACNC: <0.6 MIU/ML (ref 0–5)
ESTRADIOL SERPL-MCNC: <20 PG/ML
FSH SERPL-ACNC: 9.6 MIU/ML (ref 1.5–12.4)
LH SERPL-ACNC: 5 IU/L (ref 1.7–8.6)
TSH SERPL DL<=0.005 MIU/L-ACNC: 0.56 UIU/ML (ref 0.38–5.33)

## 2019-05-28 PROCEDURE — 83001 ASSAY OF GONADOTROPIN (FSH): CPT

## 2019-05-28 PROCEDURE — 84403 ASSAY OF TOTAL TESTOSTERONE: CPT

## 2019-05-28 PROCEDURE — 84402 ASSAY OF FREE TESTOSTERONE: CPT

## 2019-05-28 PROCEDURE — 84443 ASSAY THYROID STIM HORMONE: CPT

## 2019-05-28 PROCEDURE — 84702 CHORIONIC GONADOTROPIN TEST: CPT

## 2019-05-28 PROCEDURE — 83002 ASSAY OF GONADOTROPIN (LH): CPT

## 2019-05-28 PROCEDURE — 82670 ASSAY OF TOTAL ESTRADIOL: CPT

## 2019-05-28 PROCEDURE — 36415 COLL VENOUS BLD VENIPUNCTURE: CPT

## 2019-05-30 ENCOUNTER — TELEPHONE (OUTPATIENT)
Dept: MEDICAL GROUP | Facility: PHYSICIAN GROUP | Age: 49
End: 2019-05-30

## 2019-05-30 NOTE — TELEPHONE ENCOUNTER
----- Message from Yulissa Real P.A.-C. sent at 5/30/2019  4:28 PM PDT -----  Please inform patient that so far, his lab results are normal.  I am just waiting on the testosterone level.  Please also remind him to schedule his diagnostic mammogram.  Yulissa Real P.A.-C.

## 2019-06-01 LAB — MISCELLANEOUS LAB RESULT MISCLAB: NORMAL

## 2019-06-05 ENCOUNTER — HOSPITAL ENCOUNTER (OUTPATIENT)
Dept: RADIOLOGY | Facility: MEDICAL CENTER | Age: 49
End: 2019-06-05
Attending: PHYSICIAN ASSISTANT
Payer: COMMERCIAL

## 2019-06-05 DIAGNOSIS — N63.20 MASS OF LEFT BREAST: ICD-10-CM

## 2019-06-05 PROCEDURE — G0279 TOMOSYNTHESIS, MAMMO: HCPCS

## 2019-06-05 PROCEDURE — 76642 ULTRASOUND BREAST LIMITED: CPT | Mod: LT

## 2019-06-17 ENCOUNTER — NON-PROVIDER VISIT (OUTPATIENT)
Dept: MEDICAL GROUP | Facility: PHYSICIAN GROUP | Age: 49
End: 2019-06-17
Payer: COMMERCIAL

## 2019-06-17 DIAGNOSIS — Z23 NEED FOR VACCINATION: ICD-10-CM

## 2019-06-17 PROCEDURE — 90746 HEPB VACCINE 3 DOSE ADULT IM: CPT | Performed by: PHYSICIAN ASSISTANT

## 2019-06-17 PROCEDURE — 90471 IMMUNIZATION ADMIN: CPT | Performed by: PHYSICIAN ASSISTANT

## 2019-06-17 NOTE — NON-PROVIDER
"Enzo Quintero is a 48 y.o. male here for a non-provider visit for:   HEPATITIS B 2 of 3    Reason for immunization: continue or complete series started at the office  Immunization records indicate need for vaccine: Yes, confirmed with Epic  Minimum interval has been met for this vaccine: Yes  ABN completed: No    Order and dose verified by: JILLIAN  VIS Dated  7/20/2016 was given to patient: Yes  All IAC Questionnaire questions were answered \"No.\"    Patient tolerated injection and no adverse effects were observed or reported: Yes    Pt scheduled for next dose in series: Yes    "

## 2019-09-24 ENCOUNTER — NON-PROVIDER VISIT (OUTPATIENT)
Dept: MEDICAL GROUP | Facility: PHYSICIAN GROUP | Age: 49
End: 2019-09-24
Payer: COMMERCIAL

## 2019-09-24 DIAGNOSIS — Z23 NEED FOR VACCINATION: ICD-10-CM

## 2019-09-24 PROCEDURE — 90746 HEPB VACCINE 3 DOSE ADULT IM: CPT | Performed by: FAMILY MEDICINE

## 2019-09-24 PROCEDURE — 90686 IIV4 VACC NO PRSV 0.5 ML IM: CPT | Performed by: FAMILY MEDICINE

## 2019-09-24 PROCEDURE — 90471 IMMUNIZATION ADMIN: CPT | Performed by: FAMILY MEDICINE

## 2019-09-24 PROCEDURE — 90472 IMMUNIZATION ADMIN EACH ADD: CPT | Performed by: FAMILY MEDICINE

## 2019-09-24 NOTE — PROGRESS NOTES
"Enzo Quintero is a 48 y.o. male here for a non-provider visit for:   FLU    Reason for immunization: Annual Flu Vaccine  Immunization records indicate need for vaccine: Yes, confirmed with [place] [employer]  Minimum interval has been met for this vaccine: Yes  ABN completed: Yes    Order and dose verified by: Antonella   VIS Dated   was given to patient: Yes  All IAC Questionnaire questions were answered \"No.\"    Patient tolerated injection and no adverse effects were observed or reported: Yes    Pt scheduled for next dose in series: Not Indicated      Enzo Quintero is a 48 y.o. male here for a non-provider visit for:   HEPATITIS B 3 of 3    Reason for immunization: continue or complete series started at the office  Immunization records indicate need for vaccine: Yes, confirmed with Epic and confirmed with [employer]  Minimum interval has been met for this vaccine: Yes  ABN completed: Yes    Order and dose verified by: Antonella   VIS Dated   was given to patient: Yes  All IAC Questionnaire questions were answered \"No.\"    Patient tolerated injection and no adverse effects were observed or reported: Yes    Pt scheduled for next dose in series: Not Indicated    "

## 2020-06-02 ENCOUNTER — OFFICE VISIT (OUTPATIENT)
Dept: MEDICAL GROUP | Facility: PHYSICIAN GROUP | Age: 50
End: 2020-06-02
Payer: COMMERCIAL

## 2020-06-02 ENCOUNTER — HOSPITAL ENCOUNTER (OUTPATIENT)
Dept: LAB | Facility: MEDICAL CENTER | Age: 50
End: 2020-06-02
Attending: PHYSICIAN ASSISTANT
Payer: COMMERCIAL

## 2020-06-02 VITALS
BODY MASS INDEX: 18.28 KG/M2 | HEIGHT: 72 IN | HEART RATE: 80 BPM | OXYGEN SATURATION: 99 % | WEIGHT: 135 LBS | TEMPERATURE: 98.1 F | DIASTOLIC BLOOD PRESSURE: 68 MMHG | SYSTOLIC BLOOD PRESSURE: 100 MMHG

## 2020-06-02 DIAGNOSIS — F10.21 ALCOHOLISM IN REMISSION (HCC): ICD-10-CM

## 2020-06-02 DIAGNOSIS — Z00.00 ANNUAL PHYSICAL EXAM: ICD-10-CM

## 2020-06-02 DIAGNOSIS — J30.2 SEASONAL ALLERGIC RHINITIS, UNSPECIFIED TRIGGER: ICD-10-CM

## 2020-06-02 DIAGNOSIS — R68.82 LOW LIBIDO: ICD-10-CM

## 2020-06-02 DIAGNOSIS — K21.9 GASTROESOPHAGEAL REFLUX DISEASE, ESOPHAGITIS PRESENCE NOT SPECIFIED: ICD-10-CM

## 2020-06-02 LAB
25(OH)D3 SERPL-MCNC: 23 NG/ML (ref 30–100)
ALBUMIN SERPL BCP-MCNC: 4.4 G/DL (ref 3.2–4.9)
ALBUMIN/GLOB SERPL: 1.9 G/DL
ALP SERPL-CCNC: 41 U/L (ref 30–99)
ALT SERPL-CCNC: 22 U/L (ref 2–50)
ANION GAP SERPL CALC-SCNC: 13 MMOL/L (ref 7–16)
AST SERPL-CCNC: 26 U/L (ref 12–45)
BASOPHILS # BLD AUTO: 0.7 % (ref 0–1.8)
BASOPHILS # BLD: 0.03 K/UL (ref 0–0.12)
BILIRUB SERPL-MCNC: 0.7 MG/DL (ref 0.1–1.5)
BUN SERPL-MCNC: 14 MG/DL (ref 8–22)
CALCIUM SERPL-MCNC: 9.5 MG/DL (ref 8.5–10.5)
CHLORIDE SERPL-SCNC: 102 MMOL/L (ref 96–112)
CHOLEST SERPL-MCNC: 141 MG/DL (ref 100–199)
CO2 SERPL-SCNC: 25 MMOL/L (ref 20–33)
CREAT SERPL-MCNC: 0.77 MG/DL (ref 0.5–1.4)
EOSINOPHIL # BLD AUTO: 0.06 K/UL (ref 0–0.51)
EOSINOPHIL NFR BLD: 1.5 % (ref 0–6.9)
ERYTHROCYTE [DISTWIDTH] IN BLOOD BY AUTOMATED COUNT: 42.9 FL (ref 35.9–50)
FASTING STATUS PATIENT QL REPORTED: NORMAL
GLOBULIN SER CALC-MCNC: 2.3 G/DL (ref 1.9–3.5)
GLUCOSE SERPL-MCNC: 87 MG/DL (ref 65–99)
HCT VFR BLD AUTO: 40.4 % (ref 42–52)
HDLC SERPL-MCNC: 56 MG/DL
HGB BLD-MCNC: 13.3 G/DL (ref 14–18)
IMM GRANULOCYTES # BLD AUTO: 0.02 K/UL (ref 0–0.11)
IMM GRANULOCYTES NFR BLD AUTO: 0.5 % (ref 0–0.9)
LDLC SERPL CALC-MCNC: 75 MG/DL
LYMPHOCYTES # BLD AUTO: 1.31 K/UL (ref 1–4.8)
LYMPHOCYTES NFR BLD: 31.8 % (ref 22–41)
MCH RBC QN AUTO: 30.4 PG (ref 27–33)
MCHC RBC AUTO-ENTMCNC: 32.9 G/DL (ref 33.7–35.3)
MCV RBC AUTO: 92.4 FL (ref 81.4–97.8)
MONOCYTES # BLD AUTO: 0.39 K/UL (ref 0–0.85)
MONOCYTES NFR BLD AUTO: 9.5 % (ref 0–13.4)
NEUTROPHILS # BLD AUTO: 2.31 K/UL (ref 1.82–7.42)
NEUTROPHILS NFR BLD: 56 % (ref 44–72)
NRBC # BLD AUTO: 0 K/UL
NRBC BLD-RTO: 0 /100 WBC
PLATELET # BLD AUTO: 201 K/UL (ref 164–446)
PMV BLD AUTO: 10.7 FL (ref 9–12.9)
POTASSIUM SERPL-SCNC: 4.2 MMOL/L (ref 3.6–5.5)
PROT SERPL-MCNC: 6.7 G/DL (ref 6–8.2)
RBC # BLD AUTO: 4.37 M/UL (ref 4.7–6.1)
SODIUM SERPL-SCNC: 140 MMOL/L (ref 135–145)
TRIGL SERPL-MCNC: 49 MG/DL (ref 0–149)
WBC # BLD AUTO: 4.1 K/UL (ref 4.8–10.8)

## 2020-06-02 PROCEDURE — 82306 VITAMIN D 25 HYDROXY: CPT

## 2020-06-02 PROCEDURE — 99396 PREV VISIT EST AGE 40-64: CPT | Performed by: PHYSICIAN ASSISTANT

## 2020-06-02 PROCEDURE — 85025 COMPLETE CBC W/AUTO DIFF WBC: CPT

## 2020-06-02 PROCEDURE — 80053 COMPREHEN METABOLIC PANEL: CPT

## 2020-06-02 PROCEDURE — 36415 COLL VENOUS BLD VENIPUNCTURE: CPT

## 2020-06-02 PROCEDURE — 80061 LIPID PANEL: CPT

## 2020-06-02 RX ORDER — FLUTICASONE PROPIONATE 50 MCG
1 SPRAY, SUSPENSION (ML) NASAL DAILY
COMMUNITY

## 2020-06-02 ASSESSMENT — PATIENT HEALTH QUESTIONNAIRE - PHQ9: CLINICAL INTERPRETATION OF PHQ2 SCORE: 0

## 2020-06-02 ASSESSMENT — FIBROSIS 4 INDEX: FIB4 SCORE: 1.1

## 2020-06-02 NOTE — PROGRESS NOTES
Chief Complaint: Annual    Enzo Quintero is a 49 y.o. male who presents for annual exam. Is a new patient to me and is also establishing care today.    Last Td: 8/13/15  Regular exercise: no - but is on feet all day at grocery store. States gets 10,000 steps per day.    Patient presents to the office today for annual exam. He is a patient of Dr. Aguilar who is leaving the practice, so patient wishes to establish with me.     Current medical problems include the following:  -allergies. States has year-round allergies that manifest in sinus drainage. Was using Zyrtec but has made switch from that to Flonase which he feels is working better.    -alcoholism, currently in remission. He underwent inpatient detox programin January of last year and hospitals has been sober since that time. Endorses balanced diet. Still taking daily multivitamin.    -GERD. Also on omeprazole 20 mg for GERD which he takes very rarely--couple times per year. States stomach issues have drastically improved since he stopped drinking.    -he mentions that for about the last year, he's noticed decreased libido. Denies any problems with getting/maintaining erections, but just doesn't have the sex drive that he used to.      He  has a past medical history of Allergy, Substance abuse ([facility]), Thiamine deficiency (11/20/2018), and Transaminitis (11/20/2018).  He  has a past surgical history that includes other orthopedic surgery.  Current Outpatient Medications   Medication Sig Dispense Refill   • fluticasone (FLONASE) 50 MCG/ACT nasal spray Spray 1 Spray in nose every day.     • Multiple Vitamin (THERAGRAN PO) Take 1 Tab by mouth every day.     • omeprazole (PRILOSEC) 20 MG CPDR Take 20 mg by mouth every day.       No current facility-administered medications for this visit.      Social History     Tobacco Use   • Smoking status: Never Smoker   • Smokeless tobacco: Current User     Types: Chew   Substance Use Topics   • Alcohol use: Not Currently      Comment: s/p detox 01/2019   • Drug use: No       Review Of Systems  Skin: negative for rash, changing moles  Eyes: +corrective lenses (wears glasses)  Ears/Nose/Throat: +chronic sinus drainage (allergies)  Respiratory: negative for dyspnea  Cardiovascular: negative for palpitations,chest pain  Gastrointestinal: +rare heartburn/reflux. negative for abdominal pain and other GI issues  Psychiatric: +decreased sex drive  Endocrine: negative for h/o thyroid problem      PHYSICAL EXAMINATION:  /68 (BP Location: Left arm, Patient Position: Sitting, BP Cuff Size: Adult)   Pulse 80   Temp 36.7 °C (98.1 °F) (Tympanic)   Ht 1.829 m (6')   Wt 61.2 kg (135 lb)   SpO2 99%   Body mass index is 18.31 kg/m².  Wt Readings from Last 4 Encounters:   06/02/20 61.2 kg (135 lb)   05/21/19 60.8 kg (134 lb)   02/05/19 60.8 kg (134 lb)   11/20/18 57.6 kg (127 lb)       Constitutional: Alert, well-appearing, very pleasant, no distress.  Skin: Warm, dry, good turgor, no rashes or suspicious lesions in visible areas.  Eye: pupils equal, round and reactive to light, conjunctivae clear, lids normal.  ENMT: Lips without lesions, good dentition, oropharynx clear. Pinnae skin normal with no lesions. Large amount of cerumen visible in bilateral EACs. TMs not visible bilaterally.  Neck: supple, no masses. No submandibular or anterior cervical adenopathy. No thyromegaly.  Respiratory: Unlabored respiratory effort, lungs clear to auscultation, no wheezes, no ronchi.  Cardiovascular: Normal S1, S2, no murmur, no peripheral edema.  Abdomen: Abdomen Soft, non-tender, no masses, no hepatosplenomegaly.  Psych: Alert and oriented x3, normal affect and mood.  Musc/Skel: Normal motion UE and LE proximal and distal.        ASSESSMENT/PLAN:    1. Annual physical exam  Normal exam today.   HCM:  Up-to-date  Labs ordered  Pt counseled about skin care, diet, supplements, and exercise.  - CBC WITH DIFFERENTIAL; Future  - Comp Metabolic Panel; Future  -  VITAMIN D,25 HYDROXY; Future  - Lipid Profile; Future  - REFERENCE MISC.AMBIENT; Future    2. Low libido  New problem, uncontrolled. Will be having lab work as per above to screen for possible contributing factors including hypogonadism. If work-up negative, consider referral to sex therapist.    3. Seasonal allergic rhinitis, unspecified trigger  New problem to me, well-controlled with daily Flonase. Continue current management.    4. Alcoholism in remission (HCC)  Established problem, well-controlled with inpatient rehabilitation in 1/2019. Has been sober since that time.    5. Gastroesophageal reflux disease, esophagitis presence not specified  New problem to me, well-controlled with rare use of omeprazole. Continue current management.      Return for f/u pending lab results.    Yulissa Real P.A.-C.

## 2020-06-05 ENCOUNTER — HOSPITAL ENCOUNTER (OUTPATIENT)
Dept: LAB | Facility: MEDICAL CENTER | Age: 50
End: 2020-06-05
Attending: PHYSICIAN ASSISTANT
Payer: COMMERCIAL

## 2020-06-09 DIAGNOSIS — D64.9 ANEMIA, UNSPECIFIED TYPE: ICD-10-CM

## 2020-06-09 DIAGNOSIS — E55.9 VITAMIN D INSUFFICIENCY: ICD-10-CM

## 2020-06-12 ENCOUNTER — HOSPITAL ENCOUNTER (OUTPATIENT)
Dept: LAB | Facility: MEDICAL CENTER | Age: 50
End: 2020-06-12
Attending: PHYSICIAN ASSISTANT
Payer: COMMERCIAL

## 2020-06-12 DIAGNOSIS — Z00.00 ANNUAL PHYSICAL EXAM: ICD-10-CM

## 2020-06-12 DIAGNOSIS — E55.9 VITAMIN D INSUFFICIENCY: ICD-10-CM

## 2020-06-12 DIAGNOSIS — D64.9 ANEMIA, UNSPECIFIED TYPE: ICD-10-CM

## 2020-06-12 LAB
25(OH)D3 SERPL-MCNC: 22 NG/ML (ref 30–100)
FERRITIN SERPL-MCNC: 122 NG/ML (ref 22–322)
FOLATE SERPL-MCNC: 18.1 NG/ML
IRON SATN MFR SERPL: 39 % (ref 15–55)
IRON SERPL-MCNC: 118 UG/DL (ref 50–180)
TIBC SERPL-MCNC: 306 UG/DL (ref 250–450)
UIBC SERPL-MCNC: 188 UG/DL (ref 110–370)
VIT B12 SERPL-MCNC: 1278 PG/ML (ref 211–911)

## 2020-06-12 PROCEDURE — 83540 ASSAY OF IRON: CPT

## 2020-06-12 PROCEDURE — 82728 ASSAY OF FERRITIN: CPT

## 2020-06-12 PROCEDURE — 82746 ASSAY OF FOLIC ACID SERUM: CPT

## 2020-06-12 PROCEDURE — 36415 COLL VENOUS BLD VENIPUNCTURE: CPT

## 2020-06-12 PROCEDURE — 83550 IRON BINDING TEST: CPT

## 2020-06-12 PROCEDURE — 82607 VITAMIN B-12: CPT

## 2020-06-12 PROCEDURE — 82306 VITAMIN D 25 HYDROXY: CPT

## 2020-06-17 ENCOUNTER — TELEPHONE (OUTPATIENT)
Dept: MEDICAL GROUP | Facility: PHYSICIAN GROUP | Age: 50
End: 2020-06-17

## 2020-06-17 DIAGNOSIS — R68.82 LOW LIBIDO: ICD-10-CM

## 2020-06-17 DIAGNOSIS — Z00.00 ANNUAL PHYSICAL EXAM: ICD-10-CM

## 2020-06-17 NOTE — TELEPHONE ENCOUNTER
I have re-ordered testosterone test. Please inform patient that he will need to come in for re-draw. Needs to make sure to have done between 8-10 am in fasting state.  Yulissa Real P.A.-C.

## 2020-06-19 DIAGNOSIS — E55.9 VITAMIN D INSUFFICIENCY: ICD-10-CM

## 2020-06-19 DIAGNOSIS — D64.9 ANEMIA, UNSPECIFIED TYPE: ICD-10-CM

## 2020-06-19 NOTE — TELEPHONE ENCOUNTER
Called and spoke to pt and pt agreed that he will do his labs done, he states he will try getting them done on Monday.

## 2020-07-14 ENCOUNTER — HOSPITAL ENCOUNTER (OUTPATIENT)
Dept: LAB | Facility: MEDICAL CENTER | Age: 50
End: 2020-07-14
Attending: PHYSICIAN ASSISTANT
Payer: COMMERCIAL

## 2020-07-14 DIAGNOSIS — E55.9 VITAMIN D INSUFFICIENCY: ICD-10-CM

## 2020-07-14 DIAGNOSIS — Z00.00 ANNUAL PHYSICAL EXAM: ICD-10-CM

## 2020-07-14 DIAGNOSIS — R68.82 LOW LIBIDO: ICD-10-CM

## 2020-07-14 LAB — 25(OH)D3 SERPL-MCNC: 28 NG/ML (ref 30–100)

## 2020-07-14 PROCEDURE — 36415 COLL VENOUS BLD VENIPUNCTURE: CPT

## 2020-07-14 PROCEDURE — 84403 ASSAY OF TOTAL TESTOSTERONE: CPT

## 2020-07-14 PROCEDURE — 84402 ASSAY OF FREE TESTOSTERONE: CPT

## 2020-07-14 PROCEDURE — 82306 VITAMIN D 25 HYDROXY: CPT

## 2020-07-21 LAB — MISCELLANEOUS LAB RESULT MISCLAB: NORMAL

## 2023-08-04 ENCOUNTER — RESEARCH ENCOUNTER (OUTPATIENT)
Dept: RESEARCH | Facility: WORKSITE | Age: 53
End: 2023-08-04
Payer: COMMERCIAL

## 2023-08-05 NOTE — RESEARCH NOTE
Virtual appointment completed to enroll patient into [facility]. Consent forms signed.    Saliva collection scheduled at [place] on 8/7/2023 at 3:45.    ELF draw scheduled at [facility] on 8/9/2023 at 10:30.    Confirmed with the participant they do not have a designated provider whom they would like study results shared with. Patient will have an opportunity to share the results with any providers of their choosing in the future by printing the report through their [employer] portal or printing the results from [employer].

## 2023-08-07 ENCOUNTER — RESEARCH ENCOUNTER (OUTPATIENT)
Dept: RESEARCH | Facility: WORKSITE | Age: 53
End: 2023-08-07
Payer: COMMERCIAL

## 2023-08-07 DIAGNOSIS — Z00.6 RESEARCH STUDY PATIENT: ICD-10-CM

## 2023-08-07 NOTE — RESEARCH NOTE
Confirmed with the participant they do not have a designated provider whom they would like study results shared with. Patient will have an opportunity to share the results with any providers of their choosing in the future by printing the report through their [employer] portal or printing the results from [employer].

## 2023-08-09 ENCOUNTER — HOSPITAL ENCOUNTER (OUTPATIENT)
Dept: LAB | Facility: MEDICAL CENTER | Age: 53
End: 2023-08-09
Attending: INTERNAL MEDICINE
Payer: COMMERCIAL

## 2023-08-09 DIAGNOSIS — Z00.6 RESEARCH STUDY PATIENT: ICD-10-CM

## 2023-08-09 PROCEDURE — 36415 COLL VENOUS BLD VENIPUNCTURE: CPT

## 2023-08-14 LAB
ELF SCORE: 8.08 PPM (ref 9.8–11.3)
RELATIVE RISK: NORMAL
RISK GROUP: NORMAL
RISK: 3.3 %

## 2023-09-21 LAB
APOB+LDLR+PCSK9 GENE MUT ANL BLD/T: NOT DETECTED
BRCA1+BRCA2 DEL+DUP + FULL MUT ANL BLD/T: NOT DETECTED
MLH1+MSH2+MSH6+PMS2 GN DEL+DUP+FUL M: NOT DETECTED

## 2023-09-22 NOTE — RESULT ENCOUNTER NOTE
We have reviewed your [employer] [place] [facility] results. They are NEGATIVE for variants associated with hereditary breast and ovarian cancer, machado syndrome, and familial hypercholesterolemia. This means you are at average risk for these conditions. Please follow-up with your primary care provider or the [facility] [place] [facility] team at [phone] if you have any questions.